# Patient Record
(demographics unavailable — no encounter records)

---

## 2025-01-31 NOTE — DISCUSSION/SUMMARY
[FreeTextEntry1] : 35 year old woman  who delivered on 25 at 39 weeks. 5 days post partum  developed PPEC. She was being treated for mastitis. At that time, her BP was high and admitted. Given Mg and sent home the next day. She is feeling well and normotensive EKG normal FU in 3 months [EKG obtained to assist in diagnosis and management of assessed problem(s)] : EKG obtained to assist in diagnosis and management of assessed problem(s)

## 2025-01-31 NOTE — HISTORY OF PRESENT ILLNESS
[FreeTextEntry1] : 35 year old woman  who delivered on 25 at 39 weeks. 5 days post partum  developed PPEC. She was being treated for mastitis. At that time, her BP was high and admitted. Given Mg and sent home the next day. She is feeling well and normotensive EKG normal  Victor M Storm(Attending)